# Patient Record
Sex: MALE | Race: WHITE
[De-identification: names, ages, dates, MRNs, and addresses within clinical notes are randomized per-mention and may not be internally consistent; named-entity substitution may affect disease eponyms.]

---

## 2018-10-03 ENCOUNTER — HOSPITAL ENCOUNTER (EMERGENCY)
Dept: HOSPITAL 97 - ER | Age: 48
Discharge: HOME | End: 2018-10-03
Payer: SELF-PAY

## 2018-10-03 DIAGNOSIS — E11.40: ICD-10-CM

## 2018-10-03 DIAGNOSIS — Z88.0: ICD-10-CM

## 2018-10-03 DIAGNOSIS — M79.671: Primary | ICD-10-CM

## 2018-10-03 DIAGNOSIS — Z79.4: ICD-10-CM

## 2018-10-03 PROCEDURE — 82962 GLUCOSE BLOOD TEST: CPT

## 2018-10-03 PROCEDURE — 99284 EMERGENCY DEPT VISIT MOD MDM: CPT

## 2018-10-03 NOTE — EDPHYS
Physician Documentation                                                                           

 [facility]                                                                

Name: Dom Porter                                                                             

Age: 47 yrs                                                                                       

Sex: Male                                                                                         

: 1970                                                                                   

MRN: [id number]                                                                                   

Arrival Date: 10/03/2018                                                                          

Time: 18:21                                                                                       

Account#: [id number]                                                                            

Bed 14                                                                                            

Private MD: None, None                                                                            

ED Physician Tra Dong                                                                      

HPI:                                                                                              

10/03                                                                                             

19:16 This 47 yrs old  Male presents to ER via Ambulatory with complaints of Feet    snw 

      Swelling.                                                                                   

19:16 The patient presents with pain, swelling, to callus at lateral right foot. The          snw 

      complaints affect the right foot. Context: The problem was sustained at home. Onset:        

      The symptoms/episode began/occurred gradually. Associated signs and symptoms: The           

      patient has no apparent associated signs or symptoms. Severity of symptoms: At their        

      worst the symptoms were moderate. It is unknown whether or not the patient has had          

      similar symptoms in the past. The patient has been recently seen by a physician: other      

      ED, labs performed per pt report, pt taking Cipro and Bactrim since Saturday 4 days ago.    

                                                                                                  

Historical:                                                                                       

- Allergies:                                                                                      

18:27 PENICILLINS;                                                                            hj  

- Home Meds:                                                                                      

18:27 Humulin 70/30 100 unit/mL (70-30) Sub-Q susp [Active]; gabapentin oral oral [Active];   hj  

- PMHx:                                                                                           

18:27 Diabetes - NIDDM; neuropathy;                                                           hj  

- PSHx:                                                                                           

18:27 Appendectomy;                                                                           hj  

                                                                                                  

- Immunization history:: Adult Immunizations up to date.                                          

- Social history:: Smoking status: Patient/guardian denies using tobacco,                         

  Patient/guardian denies using alcohol.                                                          

- Ebola Screening: : Patient negative for fever greater than or equal to 101.5 degrees            

  Fahrenheit, and additional compatible Ebola Virus Disease symptoms Patient denies               

  exposure to infectious person Patient denies travel to an Ebola-affected area in the            

  21 days before illness onset.                                                                   

                                                                                                  

                                                                                                  

ROS:                                                                                              

19:15 Constitutional: Negative for fever, chills, and weight loss, Eyes: Negative for injury, snw 

      pain, redness, and discharge, ENT: Negative for injury, pain, and discharge, Neck:          

      Negative for injury, pain, and swelling, Cardiovascular: Negative for chest pain,           

      palpitations, and edema, Respiratory: Negative for shortness of breath, cough,              

      wheezing, and pleuritic chest pain, Abdomen/GI: Negative for abdominal pain, nausea,        

      vomiting, diarrhea, and constipation, Back: Negative for injury and pain, : Negative      

      for injury, bleeding, discharge, and swelling, Skin: Negative for injury, rash, and         

      discoloration, Neuro: Negative for headache, weakness, numbness, tingling, and seizure.     

19:15 MS/extremity: Positive for pain, of the right foot.                                         

                                                                                                  

Exam:                                                                                             

19:06 Constitutional:  This is a well developed, well nourished patient who is awake, alert,  snw 

      and in no acute distress. Head/Face:  Normocephalic, atraumatic. Eyes:  Pupils equal        

      round and reactive to light, extra-ocular motions intact.  Lids and lashes normal.          

      Conjunctiva and sclera are non-icteric and not injected.  Cornea within normal limits.      

      Periorbital areas with no swelling, redness, or edema. Chest/axilla:  Normal chest wall     

      appearance and motion.  Nontender with no deformity.  No lesions are appreciated.           

      Cardiovascular:  Regular rate and rhythm with a normal S1 and S2.  No gallops, murmurs,     

      or rubs.  Normal PMI, no JVD.  No pulse deficits. Respiratory:  Lungs have equal breath     

      sounds bilaterally, clear to auscultation and percussion.  No rales, rhonchi or wheezes     

      noted.  No increased work of breathing, no retractions or nasal flaring. Abdomen/GI:        

      Soft, non-tender, with normal bowel sounds.  No distension or tympany.  No guarding or      

      rebound.  No evidence of tenderness throughout. Back:  No spinal tenderness.  No            

      costovertebral tenderness.  Full range of motion. MS/ Extremity:  Pulses equal, no          

      cyanosis.  Neurovascular intact.  Full, normal range of motion. Neuro:  Awake and           

      alert, GCS 15, oriented to person, place, time, and situation.  Cranial nerves II-XII       

      grossly intact.  Motor strength 5/5 in all extremities.  Sensory grossly intact.            

      Cerebellar exam normal.  Normal gait. Psych:  Awake, alert, with orientation to person,     

      place and time.  Behavior, mood, and affect are within normal limits.                       

19:06 Skin: Appearance: Color: pale, lesion(s), noted, and can be described as raised, opened     

      callus to right lateral midfoot with minimal surrounding erythema.                          

                                                                                                  

Vital Signs:                                                                                      

18:28  / 85; Pulse 101; Resp 18; Temp 97.8(TE); Pulse Ox 97% on R/A; Weight 97.52 kg;   hj  

      Height 5 ft. 11 in. (180.34 cm); Pain 10/10;                                                

19:22  / 90; Pulse 92; Resp 18; Pulse Ox 96% on R/A; Pain 10/10;                        jb4 

18:28 Body Mass Index 29.99 (97.52 kg, 180.34 cm)                                             hj  

                                                                                                  

MDM:                                                                                              

18:54 Patient medically screened.                                                             snw 

19:28 Data reviewed: vital signs, nurses notes. Data interpreted: Pulse oximetry: on room air snw 

      is 96 %. Interpretation: acceptable. Counseling: I had a detailed discussion with the       

      patient and/or guardian regarding: the historical points, exam findings, and any            

      diagnostic results supporting the discharge/admit diagnosis, the presence of at least       

      one elevated blood pressure reading (>120/80) during this emergency department visit,       

      radiology results, the need for outpatient follow up, to return to the emergency            

      department if symptoms worsen or persist or if there are any questions or concerns that     

      arise at home. Special discussion: I have referred the patient to see his PCP for           

      further evaluation of high blood pressure. I discussed in detail with the patient the       

      higher chance of wound infection based on his presenting history. Based on the history      

      and exam findings, there is no indication for further emergent testing or inpatient         

      evaluation. I discussed with the patient/guardian the need to see the foot specialist       

      for further evaluation of the symptoms. I discussed with the patient/guardian the need      

      to see the primary care provider for further evaluation of the symptoms.                    

                                                                                                  

10/03                                                                                             

18:53 Order name: Foot Right 3 View XRAY; Complete Time: 19:24                                snw 

10/03                                                                                             

19:05 Order name: FSBS; Complete Time: 19:23                                                  snw 

10/03                                                                                             

19:25 Order name: Wound Care; Complete Time: 19:37                                            snw 

                                                                                                  

Administered Medications:                                                                         

19:37 Drug: Norco 5 mg-325 mg 1 tabs Route: PO;                                               jb4 

19:37 Drug: Hibiclens 4 % 1 application Route: Topical; Site: wound;                          jb4 

                                                                                                  

                                                                                                  

Point of Care Testing:                                                                            

      Blood Glucose:                                                                              

19:22 Blood Glucose: 254 mg/dL;                                                               jb4 

      Ranges:                                                                                     

      Critical Glucose Levels:Adult <50 mg/dl or >400 mg/dl  <40 mg/dl or >180 mg/dl       

Disposition:                                                                                      

10/04                                                                                             

07:14 Co-signature as Attending Physician, Tra Dong MD I agree with the assessment and  monica 

      plan of care.                                                                               

                                                                                                  

Disposition:                                                                                      

10/03/18 19:26 Discharged to Home. Impression: Pain in right foot.                                

- Condition is Stable.                                                                            

- Discharge Instructions: Musculoskeletal Pain, Heat Therapy.                                     

- Prescriptions for Tylenol- Codeine #3 300-30 mg Oral Tablet - take 2 tablets by ORAL            

  route every 6 hours As needed; 10 tablet.                                                       

- Work release form, Medication Reconciliation Form, Thank You Letter, Antibiotic                 

  Education, Prescription Opioid Use form.                                                        

- Follow up: Private Physician; When: Tomorrow; Reason: Recheck today's complaints,               

  Continuance of care, Re-evaluation by your physician. Follow up: Emergency                      

  Department; When: As needed; Reason: Worsening of condition.                                    

- Notes: Please continue Cipro and Bactrim as directed per [facility]                                   

                                                                                                  

                                                                                                  

Signatures:                                                                                       

Dispatcher [employer]                           EDMS                                                 

Tra Dong MD MD cha Therrien, Shelly, FNP-C                 FNP-Csnw                                                  

Mason Hernandez RN                      RN   Al Wu RN                       RN   jb4                                                  

                                                                                                  

Corrections: (The following items were deleted from the chart)                                    

10/03                                                                                             

19:53 19:26 10/03/2018 19:26 Discharged to Home. Impression: Pain in right foot. Condition is jb4 

      Stable. Forms are Medication Reconciliation Form, Thank You Letter, Antibiotic              

      Education, Prescription Opioid Use. Follow up: Private Physician; When: Tomorrow;           

      Reason: Recheck today's complaints, Continuance of care, Re-evaluation by your              

      physician. Follow up: Emergency Department; When: As needed; Reason: Worsening of           

      condition. snw                                                                              

                                                                                                  

**************************************************************************************************

## 2018-10-03 NOTE — ER
Nurse's Notes                                                                                     

 [facility]                                                                

Name: Dom Porter                                                                             

Age: 47 yrs                                                                                       

Sex: Male                                                                                         

: 1970                                                                                   

MRN: [id number]                                                                                   

Arrival Date: 10/03/2018                                                                          

Time: 18:21                                                                                       

Account#: [id number]                                                                            

Bed 14                                                                                            

Private MD: None, None                                                                            

Diagnosis: Pain in right foot                                                                     

                                                                                                  

Presentation:                                                                                     

10/03                                                                                             

18:23 Presenting complaint: Patient states: i have a large decubitus ulcer on the R side of   hj  

      my R foot, went to [facility], they did blood work, started with Cipro and Bactrim, but i         

      feel its getting worse; it is foul smelling and its draining black colored discharges;      

      reports fever and chills; pain is 10/10;. Transition of care: patient was not received      

      from another setting of care. Onset of symptoms was 2018. Risk Assessment:      

      Do you want to hurt yourself or someone else? Patient reports no desire to harm self or     

      others. Initial Sepsis Screen: Does the patient meet any 2 criteria? No. Patient's          

      initial sepsis screen is negative. Does the patient have a suspected source of              

      infection? No. Patient's initial sepsis screen is negative. Care prior to arrival: None.    

18:23 Method Of Arrival: Ambulatory                                                             

18:23 Acuity: TELMA 3                                                                           hj  

                                                                                                  

Triage Assessment:                                                                                

18:27 General: Appears in no apparent distress. uncomfortable, Behavior is calm, cooperative, hj  

      appropriate for age. Pain: Complains of pain in right foot.                                 

                                                                                                  

Historical:                                                                                       

- Allergies:                                                                                      

18:27 PENICILLINS;                                                                            hj  

- Home Meds:                                                                                      

18:27 Humulin 70/30 100 unit/mL (70-30) Sub-Q susp [Active]; gabapentin oral oral [Active];   hj  

- PMHx:                                                                                           

18:27 Diabetes - NIDDM; neuropathy;                                                           hj  

- PSHx:                                                                                           

18:27 Appendectomy;                                                                           hj  

                                                                                                  

- Immunization history:: Adult Immunizations up to date.                                          

- Social history:: Smoking status: Patient/guardian denies using tobacco,                         

  Patient/guardian denies using alcohol.                                                          

- Ebola Screening: : Patient negative for fever greater than or equal to 101.5 degrees            

  Fahrenheit, and additional compatible Ebola Virus Disease symptoms Patient denies               

  exposure to infectious person Patient denies travel to an Ebola-affected area in the            

  21 days before illness onset.                                                                   

                                                                                                  

                                                                                                  

Screenin:28 Abuse screen: Denies threats or abuse. Denies injuries from another. Nutritional        hj  

      screening: No deficits noted. Tuberculosis screening: No symptoms or risk factors           

      identified. Fall Risk None identified.                                                      

                                                                                                  

Assessment:                                                                                       

18:45 General: Appears in no apparent distress. uncomfortable, Behavior is cooperative,       bp  

      appropriate for age, anxious. Pain: Complains of pain in right foot. Neuro: Level of        

      Consciousness is awake, alert, obeys commands, Oriented to person, place, time,             

      situation, Appropriate for age. Cardiovascular: No deficits noted. Respiratory: Airway      

      is patent Respiratory effort is even, unlabored, Respiratory pattern is regular,            

      symmetrical. GI: No signs and/or symptoms were reported involving the gastrointestinal      

      system. : No signs and/or symptoms were reported regarding the genitourinary system.      

      EENT: No deficits noted. Derm: Wound noted right foot. Musculoskeletal: Circulation,        

      motion, and sensation intact. Range of motion: intact in all extremities.                   

                                                                                                  

Vital Signs:                                                                                      

18:28  / 85; Pulse 101; Resp 18; Temp 97.8(TE); Pulse Ox 97% on R/A; Weight 97.52 kg;   hj  

      Height 5 ft. 11 in. (180.34 cm); Pain 10/10;                                                

19:22  / 90; Pulse 92; Resp 18; Pulse Ox 96% on R/A; Pain 10/10;                        jb4 

18:28 Body Mass Index 29.99 (97.52 kg, 180.34 cm)                                             hj  

                                                                                                  

ED Course:                                                                                        

18:21 Patient arrived in ED.                                                                  mr  

18:21 None, None is Private Physician.                                                        mr  

18:26 Triage completed.                                                                       hj  

18:28 Arm band placed on left wrist.                                                          hj  

18:28 Patient has correct armband on for positive identification. Placed in gown. Bed in low  hj  

      position. Call light in reach. Side rails up X 1. Adult w/ patient.                         

18:36 Gil Quiros, TI is Primary Nurse.                                                    bp  

18:54 Britni Dawkins FNP-C is PHCP.                                                        snw 

18:54 Tra Dong MD is Attending Physician.                                             snw 

19:11 X-ray completed. Portable x-ray completed in exam room. Patient tolerated procedure     az  

      well.                                                                                       

19:13 Foot Right 3 View XRAY In Process Unspecified.                                          EDMS

19:50 Ortho shoe applied to.                                                                  jb4 

19:50 No provider procedures requiring assistance completed. Patient did not have IV access   jb4 

      during this emergency room visit.                                                           

                                                                                                  

Administered Medications:                                                                         

19:37 Drug: Norco 5 mg-325 mg 1 tabs Route: PO;                                               jb4 

19:37 Drug: Hibiclens 4 % 1 application Route: Topical; Site: wound;                          jb4 

                                                                                                  

                                                                                                  

Point of Care Testing:                                                                            

      Blood Glucose:                                                                              

19:22 Blood Glucose: 254 mg/dL;                                                               jb4 

      Ranges:                                                                                     

                                                                                                  

Outcome:                                                                                          

19:26 Discharge ordered by MD. hadley 

19:50 Discharged to home ambulatory.                                                          jb4 

19:50 Condition: stable                                                                           

19:50 Discharge instructions given to patient, family, Instructed on discharge instructions,      

      follow up and referral plans. medication usage, Demonstrated understanding of               

      instructions, follow-up care, medications, Prescriptions given X 1.                         

19:53 Patient left the ED.                                                                    jb4 

                                                                                                  

Signatures:                                                                                       

Dispatcher [employer]                           EDMS                                                 

Britni Dawkins, NARINDERP-C                 FNP-Csnvaleria                                                  

ElroyNaidya                                 mr QureshiMason smart RN RN hj Bryson, James, RN RN   jb4                                                  

Gil Quiros RN RN bp Zavala, Araceli az                                                   

                                                                                                  

**************************************************************************************************

## 2018-10-03 NOTE — RAD REPORT
EXAM DESCRIPTION:  RAD - Foot Right 3 View - 10/3/2018 7:13 pm

 

CLINICAL HISTORY:   Right foot pain and swelling

 

FINDINGS:  Soft tissue ulceration involves the lateral forefoot.

 

No bony destructive lesion is seen.

 

An old fracture of the fifth metatarsal is suspected. The bones are osteoporotic

 

Large calcaneal spur is seen

## 2018-12-03 ENCOUNTER — HOSPITAL ENCOUNTER (EMERGENCY)
Dept: HOSPITAL 97 - ER | Age: 48
Discharge: HOME | End: 2018-12-03
Payer: COMMERCIAL

## 2018-12-03 DIAGNOSIS — E11.9: ICD-10-CM

## 2018-12-03 DIAGNOSIS — Z88.0: ICD-10-CM

## 2018-12-03 DIAGNOSIS — E86.0: Primary | ICD-10-CM

## 2018-12-03 DIAGNOSIS — Z79.4: ICD-10-CM

## 2018-12-03 LAB
ALBUMIN SERPL BCP-MCNC: 2.9 G/DL (ref 3.4–5)
ALP SERPL-CCNC: 117 U/L (ref 45–117)
ALT SERPL W P-5'-P-CCNC: 23 U/L (ref 12–78)
AST SERPL W P-5'-P-CCNC: 11 U/L (ref 15–37)
BUN BLD-MCNC: 28 MG/DL (ref 7–18)
GLUCOSE SERPLBLD-MCNC: 213 MG/DL (ref 74–106)
HCT VFR BLD CALC: 34.9 % (ref 39.6–49)
LIPASE SERPL-CCNC: 90 U/L (ref 73–393)
LYMPHOCYTES # SPEC AUTO: 1.5 K/UL (ref 0.7–4.9)
MCH RBC QN AUTO: 29.9 PG (ref 27–35)
MCV RBC: 85.1 FL (ref 80–100)
PMV BLD: 8 FL (ref 7.6–11.3)
POTASSIUM SERPL-SCNC: 4 MMOL/L (ref 3.5–5.1)
RBC # BLD: 4.1 M/UL (ref 4.33–5.43)

## 2018-12-03 PROCEDURE — 83690 ASSAY OF LIPASE: CPT

## 2018-12-03 PROCEDURE — 84484 ASSAY OF TROPONIN QUANT: CPT

## 2018-12-03 PROCEDURE — 85025 COMPLETE CBC W/AUTO DIFF WBC: CPT

## 2018-12-03 PROCEDURE — 93005 ELECTROCARDIOGRAM TRACING: CPT

## 2018-12-03 PROCEDURE — 74176 CT ABD & PELVIS W/O CONTRAST: CPT

## 2018-12-03 PROCEDURE — 82962 GLUCOSE BLOOD TEST: CPT

## 2018-12-03 PROCEDURE — 80076 HEPATIC FUNCTION PANEL: CPT

## 2018-12-03 PROCEDURE — 80048 BASIC METABOLIC PNL TOTAL CA: CPT

## 2018-12-03 PROCEDURE — 96361 HYDRATE IV INFUSION ADD-ON: CPT

## 2018-12-03 PROCEDURE — 76377 3D RENDER W/INTRP POSTPROCES: CPT

## 2018-12-03 PROCEDURE — 96374 THER/PROPH/DIAG INJ IV PUSH: CPT

## 2018-12-03 PROCEDURE — 36415 COLL VENOUS BLD VENIPUNCTURE: CPT

## 2018-12-03 PROCEDURE — 96375 TX/PRO/DX INJ NEW DRUG ADDON: CPT

## 2018-12-03 PROCEDURE — 99285 EMERGENCY DEPT VISIT HI MDM: CPT

## 2018-12-03 NOTE — RAD REPORT
EXAM DESCRIPTION:  CT - Stone Protocol - 12/3/2018 2:13 pm

 

CLINICAL HISTORY:  Flank pain.

ABD PAIN

 

COMPARISON:  No comparisons

 

TECHNIQUE:  Axial images were obtained without oral or IV contrast. Lack of contrast limits solid org
an and vascular assessment. The field-of-view spans the entirety of the  system partially obscuring
 uppermost abdomen and lung bases. Coronal reformatted images were obtained and reviewed.

 

All CT scans are performed using dose optimization technique as appropriate and may include automated
 exposure control or mA/KV adjustment according to patient size.

 

FINDINGS:  The lower lung fields are clear.

 

The hepatic size is mildly prominent. Gallbladder appears mildly distended. Spleen is normal in size.
 The pancreas and adrenal glands are normal. No pathologic lymphadenopathy in the abdomen or pelvis.

 

No urinary tract stones or obstructive uropathy.

 

No bowel obstruction, free air, free fluid or abscess. Appendectomy.

 

No significant bony abnormality.

 

IMPRESSION:  Hepatomegaly.

 

Mild gallbladder distention.

## 2018-12-03 NOTE — ER
Nurse's Notes                                                                                     

 [facility]                                                                

Name: Dom Porter                                                                             

Age: 47 yrs                                                                                       

Sex: Male                                                                                         

: 1970                                                                                   

MRN: [id number]                                                                                   

Arrival Date: 2018                                                                          

Time: 12:36                                                                                       

Account#: [id number]                                                                            

Bed 18                                                                                            

Private MD:                                                                                       

Diagnosis: Vomiting;Dehydration                                                                   

                                                                                                  

Presentation:                                                                                     

                                                                                             

12:36 Presenting complaint: EMS states: i have been vomiting for 9 days and the zofran        tw2 

      [place] gave me is not helping. Transition of care: patient was not received from          

      another setting of care. Onset of symptoms was 2018. Risk Assessment: Do       

      you want to hurt yourself or someone else? Patient reports no desire to harm self or        

      others. Initial Sepsis Screen: Does the patient meet any 2 criteria? No. Patient's          

      initial sepsis screen is negative. Does the patient have a suspected source of              

      infection? No. Patient's initial sepsis screen is negative. Care prior to arrival:          

      Medication(s) given: zofran IM, multiple failed iv attempts via ems.                        

12:36 Method Of Arrival: EMS: [facility]                                                 tw2 

12:36 Acuity: TELMA 3                                                                           tw2 

                                                                                                  

Historical:                                                                                       

- Allergies:                                                                                      

12:41 PENICILLINS;                                                                            tw2 

- Home Meds:                                                                                      

12:41 Humulin 70/30 100 unit/mL (70-30) Sub-Q susp [Active]; gabapentin Oral [Active];        tw2 

- PMHx:                                                                                           

12:41 neuropathy; Diabetes - IDDM;                                                            tw2 

- PSHx:                                                                                           

12:41 Appendectomy;                                                                           tw2 

                                                                                                  

- Immunization history:: Adult Immunizations.                                                     

- Social history:: Smoking status: .                                                              

- Ebola Screening: : Patient negative for fever greater than or equal to 101.5 degrees            

  Fahrenheit, and additional compatible Ebola Virus Disease symptoms Patient denies               

  travel to an Ebola-affected area in the 21 days before illness onset.                           

                                                                                                  

                                                                                                  

Screenin:43 Abuse screen: Denies threats or abuse. Nutritional screening: No deficits noted.        tw2 

      Tuberculosis screening: No symptoms or risk factors identified. Fall Risk None              

      identified.                                                                                 

                                                                                                  

Assessment:                                                                                       

12:40 General: Appears in no apparent distress. Behavior is calm, cooperative, appropriate    tw2 

      for age. Pain: Complains of pain in mid-sternal area, epi-gastric pain. Neuro: Level of     

      Consciousness is awake, alert, obeys commands, Oriented to person, place, time,             

      situation. Cardiovascular: Heart tones S1 S2 Patient's skin is warm and dry.                

      Respiratory: Airway is patent Respiratory effort is even, unlabored, Respiratory            

      pattern is regular, symmetrical, Breath sounds are clear bilaterally. GI: Abdomen is        

      round distended, Bowel sounds present X 4 quads. Reports nausea, vomiting, for 9 days,      

      they gave zofran but i cant keep it down. : No signs and/or symptoms were reported        

      regarding the genitourinary system. EENT: No signs and/or symptoms were reported            

      regarding the EENT system. Derm: Skin is intact, with poor turgor Skin is dry, Skin is      

      pale. Musculoskeletal: Range of motion: intact in all extremities.                          

13:42 Reassessment: dr. clay at bedside at this time.                                        tw2 

15:00 Reassessment: Patient appears in no apparent distress at this time. No changes from     tw2 

      previously documented assessment. Patient and/or family updated on plan of care and         

      expected duration. Pain level reassessed.                                                   

16:02 Reassessment: Patient appears in no apparent distress at this time. No changes from     tw2 

      previously documented assessment. Patient and/or family updated on plan of care and         

      expected duration. Pain level reassessed.                                                   

16:11 Reassessment: pt states "i just feel this burning up here, cant they just keep me       tw2 

      overnight to monitor me", provider notified.                                                

17:11 Reassessment: Patient appears in no apparent distress at this time. No changes from     tw2 

      previously documented assessment. Patient and/or family updated on plan of care and         

      expected duration. Pain level reassessed.                                                   

17:49 Reassessment: Patient appears in no apparent distress at this time. Patient and/or      tw2 

      family updated on plan of care and expected duration. Pain level reassessed. Patient        

      states feeling better.                                                                      

                                                                                                  

Vital Signs:                                                                                      

12:37 Temp 98.9(O);                                                                           tw2 

12:39  / 89; Pulse 99; Resp 17; Pulse Ox 98% on R/A;                                    tw2 

13:28  / 79; Pulse 91; Resp 17; Pulse Ox 97% on R/A;                                    tw2 

15:00  / 88; Pulse 94; Resp 17; Pulse Ox 96% on R/A;                                    tw2 

16:02  / 93; Pulse 92; Resp 17; Pulse Ox 96% on R/A;                                    tw2 

17:10  / 93; Pulse 87; Resp 17; Pulse Ox 95% on R/A;                                    tw2 

17:49  / 99; Pulse 86; Resp 17; Pulse Ox 99% on R/A;                                    tw2 

                                                                                                  

ED Course:                                                                                        

12:36 Patient arrived in ED.                                                                  tw2 

12:37 Triage completed.                                                                       tw2 

12:37 Arm band placed on.                                                                     tw2 

12:39 Adriana Jarquin RN is Primary Nurse.                                                        tw2 

12:41 Side rails up X2. Adult w/ patient. Cardiac monitor on. Pulse ox on. NIBP on.           tw2 

12:52 Martin Clay MD is Attending Physician.                                              gs  

13:08 Missed attempt(s): 22 gauge in right antecubital area. Bleeding controlled, band aid    tw2 

      applied, catheter tip intact. Missed attempt(s): 22 gauge in left wrist. Bleeding           

      controlled, band aid applied, catheter tip intact.                                          

13:42 Missed attempt(s): 22 gauge in right forearm. per Pushpa Snowden, notified Dr. Clay.    tw2 

14:11 Inserted saline lock: 18 gauge in right upper arm, using aseptic technique. ,using      tw2 

      aseptic technique. per TI Cordero using US guided technique Blood collected.                  

14:12 CT completed. Patient tolerated procedure well. Patient moved to CT via wheelchair.     sj  

      Patient moved back from CT.                                                                 

14:23 CT Stone Protocol In Process Unspecified.                                               EDMS

16:01 Troponin I Sent.                                                                        tw2 

16:02 EKG done, by EKG tech. reviewed by Martin Clay MD.                                    dt2 

17:49 No provider procedures requiring assistance completed. IV discontinued, intact,         tw2 

      bleeding controlled, No redness/swelling at site. Pressure dressing applied.                

                                                                                                  

Administered Medications:                                                                         

14:22 Drug: NS 0.9% 1000 ml Route: IV; Rate: 1 bolus; Site: right upper arm;                  tw2 

16:01 Follow up: Response: No adverse reaction; IV Status: Completed infusion; IV Intake:     tw2 

      1000ml                                                                                      

14:22 Drug: Zofran 4 mg Route: IVP; Site: right upper arm;                                    tw2 

16:01 Follow up: Response: No adverse reaction; Nausea is decreased                           tw2 

15:56 Drug: CarafATE 1 grams Route: PO;                                                       tw2 

17:10 Follow up: Response: No adverse reaction                                                tw2 

17:10 Drug: Reglan 10 mg Route: IVP; Site: right upper arm;                                   tw2 

17:49 Follow up: Response: No adverse reaction; Nausea is decreased                           tw2 

                                                                                                  

                                                                                                  

Intake:                                                                                           

16:01 IV: 1000ml; Total: 1000ml.                                                              tw2 

                                                                                                  

Outcome:                                                                                          

17:45 Discharge ordered by MD.                                                                  

17:50 Discharged to home ambulatory, with significant other.                                  tw2 

17:50 Condition: stable                                                                           

17:50 Discharge instructions given to patient, significant other, Instructed on discharge         

      instructions, follow up and referral plans. medication usage, Demonstrated                  

      understanding of instructions, follow-up care, medications, Prescriptions given X 2.        

17:50 Patient left the ED.                                                                    tw2 

                                                                                                  

Signatures:                                                                                       

Dispatcher [employer]                           Katerin Silva Tara, RN                          RN   tw2                                                  

Martin Clay MD MD gs Teague, Danielle                             dt2                                                  

                                                                                                  

**************************************************************************************************

## 2018-12-03 NOTE — EDPHYS
Physician Documentation                                                                           

 [facility]                                                                

Name: Dom Porter                                                                             

Age: 47 yrs                                                                                       

Sex: Male                                                                                         

: 1970                                                                                   

MRN: [id number]                                                                                   

Arrival Date: 2018                                                                          

Time: 12:36                                                                                       

Account#: [id number]                                                                            

Bed 18                                                                                            

Private MD:                                                                                       

ED Physician Martin Tarango                                                                       

HPI:                                                                                              

                                                                                             

18:39 This 47 yrs old  Male presents to ER via EMS with complaints of Vomiting.      gs  

18:39 Onset: The symptoms/episode began/occurred 3 day(s) ago. Possible causes: dx with flu.  gs  

      The symptoms are aggravated by nothing. The symptoms are alleviated by nothing.             

      Associated signs and symptoms: Pertinent negatives: abdominal pain, anorexia, GI            

      bleeding. Severity of symptoms: At their worst the symptoms were moderate in the            

      emergency department the symptoms are unchanged. The patient has experienced similar        

      episodes in the past, a few times. The patient has been recently seen by a physician:       

      princess jones.                                                                                

                                                                                                  

Historical:                                                                                       

- Allergies:                                                                                      

12:41 PENICILLINS;                                                                            tw2 

- Home Meds:                                                                                      

12:41 Humulin 70/30 100 unit/mL (70-30) Sub-Q susp [Active]; gabapentin Oral [Active];        tw2 

- PMHx:                                                                                           

12:41 neuropathy; Diabetes - IDDM;                                                            tw2 

- PSHx:                                                                                           

12:41 Appendectomy;                                                                           tw2 

                                                                                                  

- Immunization history:: Adult Immunizations.                                                     

- Social history:: Smoking status: .                                                              

- Ebola Screening: : Patient negative for fever greater than or equal to 101.5 degrees            

  Fahrenheit, and additional compatible Ebola Virus Disease symptoms Patient denies               

  travel to an Ebola-affected area in the 21 days before illness onset.                           

                                                                                                  

                                                                                                  

ROS:                                                                                              

18:39 All other systems are negative.                                                         gs  

                                                                                                  

Exam:                                                                                             

18:39 Head/Face:  Normocephalic, atraumatic. Eyes:  Pupils equal round and reactive to light, gs  

      extra-ocular motions intact.  Lids and lashes normal.  Conjunctiva and sclera are           

      non-icteric and not injected.  Cornea within normal limits.  Periorbital areas with no      

      swelling, redness, or edema. ENT:  Nares patent. No nasal discharge, no septal              

      abnormalities noted.  Tympanic membranes are normal and external auditory canals are        

      clear.  Oropharynx with no redness, swelling, or masses, exudates, or evidence of           

      obstruction, uvula midline.  Mucous membranes moist. Neck:  Trachea midline, no             

      thyromegaly or masses palpated, and no cervical lymphadenopathy.  Supple, full range of     

      motion without nuchal rigidity, or vertebral point tenderness.  No Meningismus.             

      Chest/axilla:  Normal chest wall appearance and motion.  Nontender with no deformity.       

      No lesions are appreciated. Cardiovascular:  Regular rate and rhythm with a normal S1       

      and S2.  No gallops, murmurs, or rubs.  Normal PMI, no JVD.  No pulse deficits.             

      Respiratory:  Lungs have equal breath sounds bilaterally, clear to auscultation and         

      percussion.  No rales, rhonchi or wheezes noted.  No increased work of breathing, no        

      retractions or nasal flaring. Back:  No spinal tenderness.  No costovertebral               

      tenderness.  Full range of motion. Skin:  Warm, dry with normal turgor.  Normal color       

      with no rashes, no lesions, and no evidence of cellulitis. MS/ Extremity:  Pulses           

      equal, no cyanosis.  Neurovascular intact.  Full, normal range of motion. Neuro:  Awake     

      and alert, GCS 15, oriented to person, place, time, and situation.  Cranial nerves          

      II-XII grossly intact.  Motor strength 5/5 in all extremities.  Sensory grossly intact.     

       Cerebellar exam normal.  Normal gait.                                                      

18:39 Constitutional: The patient appears alert, awake, pale, uncomfortable.                      

18:39 ECG was reviewed by the Attending Physician.                                                

18:39 Abdomen/GI: Palpation: mild abdominal tenderness, in all quadrants, rebound tenderness,     

      is not appreciated.                                                                         

                                                                                                  

Vital Signs:                                                                                      

12:37 Temp 98.9(O);                                                                           tw2 

12:39  / 89; Pulse 99; Resp 17; Pulse Ox 98% on R/A;                                    tw2 

13:28  / 79; Pulse 91; Resp 17; Pulse Ox 97% on R/A;                                    tw2 

15:00  / 88; Pulse 94; Resp 17; Pulse Ox 96% on R/A;                                    tw2 

16:02  / 93; Pulse 92; Resp 17; Pulse Ox 96% on R/A;                                    tw2 

17:10  / 93; Pulse 87; Resp 17; Pulse Ox 95% on R/A;                                    tw2 

17:49  / 99; Pulse 86; Resp 17; Pulse Ox 99% on R/A;                                    tw2 

                                                                                                  

MDM:                                                                                              

13:48 Patient medically screened.                                                             gs  

18:39 Differential diagnosis: gastritis, viral gastroenteritis, gastroenteritis, dka. Data    gs  

      reviewed: vital signs, nurses notes, lab test result(s), EKG, radiologic studies.           

      Response to treatment: the patient's symptoms have markedly improved after treatment,       

      the patient's condition has returned to base line, patient is well hydrated. and as a       

      result, I will discharge patient.                                                           

                                                                                                  

                                                                                             

13:50 Order name: Basic Metabolic Panel; Complete Time: 15:12                                 gs  

                                                                                             

13:50 Order name: CBC with Diff; Complete Time: 15:12                                         gs  

                                                                                             

13:50 Order name: Hepatic Function; Complete Time: 15:12                                      gs  

                                                                                             

13:50 Order name: Lipase; Complete Time: 15:12                                                gs  

                                                                                             

13:52 Order name: Glucose, Ancillary Testing; Complete Time: 14:37                            EDMS

                                                                                             

15:43 Order name: Troponin I; Complete Time: 16:30                                            gs  

                                                                                             

13:50 Order name: IV Saline Lock; Complete Time: 14:11                                        gs  

                                                                                             

13:50 Order name: Labs collected and sent; Complete Time: 14:11                               gs  

                                                                                             

13:50 Order name: CT Stone Protocol; Complete Time: 14:37                                     gs  

                                                                                             

15:43 Order name: EKG; Complete Time: 15:44                                                   gs  

                                                                                             

15:43 Order name: EKG - Nurse/Tech; Complete Time: 16:01                                      gs  

                                                                                                  

EC:39 Rate is 91 beats/min. Rhythm is regular. WA interval is normal. QRS interval is normal. gs  

      T waves are Flattened. Clinical impression: NSR w/ Non-specific ST/T Changes.               

      Interpreted by me.                                                                          

                                                                                                  

Administered Medications:                                                                         

14:22 Drug: NS 0.9% 1000 ml Route: IV; Rate: 1 bolus; Site: right upper arm;                  tw2 

16:01 Follow up: Response: No adverse reaction; IV Status: Completed infusion; IV Intake:     tw2 

      1000ml                                                                                      

14:22 Drug: Zofran 4 mg Route: IVP; Site: right upper arm;                                    tw2 

16:01 Follow up: Response: No adverse reaction; Nausea is decreased                           tw2 

15:56 Drug: CarafATE 1 grams Route: PO;                                                       tw2 

17:10 Follow up: Response: No adverse reaction                                                tw2 

17:10 Drug: Reglan 10 mg Route: IVP; Site: right upper arm;                                   tw2 

17:49 Follow up: Response: No adverse reaction; Nausea is decreased                           tw2 

                                                                                                  

                                                                                                  

Disposition:                                                                                      

18 17:45 Discharged to Home. Impression: Vomiting, Dehydration.                             

- Condition is Stable.                                                                            

- Discharge Instructions: Nausea and Vomiting, Adult.                                             

- Prescriptions for Reglan 5 mg Oral tablet - take 1 tablet by ORAL route 4 times per             

  day As needed 30 minutes before meals and at bedtime; 20 tablet. Pepcid 20 mg Oral              

  Tablet - take 1 tablet by ORAL route once daily; 20 tablet.                                     

- Medication Reconciliation Form, Thank You Letter, Antibiotic Education, Prescription            

  Opioid Use form.                                                                                

- Follow up: Private Physician; When: 1 - 2 days; Reason: Re-evaluation by your                   

  physician.                                                                                      

                                                                                                  

                                                                                                  

                                                                                                  

Signatures:                                                                                       

Dispatcher [employer]                           EDMS                                                 

Adriana Jarquin RN                          RN   tw2                                                  

Martin Tarango MD MD gs                                                   

                                                                                                  

Corrections: (The following items were deleted from the chart)                                    

17:50 17:45 2018 17:45 Discharged to Home. Impression: Vomiting; Dehydration. Condition tw2 

      is Stable. Forms are Medication Reconciliation Form, Thank You Letter, Antibiotic           

      Education, Prescription Opioid Use. Follow up: Private Physician; When: 1 - 2 days;         

      Reason: Re-evaluation by your physician. gs                                                 

                                                                                                  

**************************************************************************************************

## 2018-12-03 NOTE — XMS REPORT
Patient Summary Document

 Created on:December 3, 2018



Patient:SHAUN JO

Sex:Male

:1970

External Reference #:[id number]





Demographics







 Address  [address] [place]



   [place] [address]

 

 Home Phone  [phone]

 

 Work Phone  [phone]

 

 Email Address  NONE

 

 Preferred Language  Unknown

 

 Marital Status  Unknown

 

 Caodaism Affiliation  Unknown

 

 Race  Unknown

 

 Additional Race(s)  Unavailable

 

 Ethnic Group  Unknown









Author







 Organization  [facility]

 

 Address  [address] [place] [address] [place] [id number]

 

 Phone  [phone]









Care Team Providers







 Name  Role  Phone

 

 Unavailable  Unavailable  Unavailable









Problems

This patient has no known problems.



Allergies, Adverse Reactions, Alerts

This patient has no known allergies or adverse reactions.



Medications

This patient has no known medications.

## 2018-12-04 NOTE — EKG
Test Date:    2018-12-03               Test Time:    15:47:22

Technician:   MAKENNA                                     

                                                     

MEASUREMENT RESULTS:                                       

Intervals:                                           

Rate:         91                                     

MO:           148                                    

QRSD:         86                                     

QT:           362                                    

QTc:          445                                    

Axis:                                                

P:            29                                     

MO:           148                                    

QRS:          -3                                     

T:            -2                                     

                                                     

INTERPRETIVE STATEMENTS:                                       

                                                     

Normal sinus rhythm

Normal ECG

No previous ECG available for comparison



Electronically Signed On 12-04-18 07:05:32 CST by Vinh Huerta